# Patient Record
Sex: FEMALE | URBAN - NONMETROPOLITAN AREA
[De-identification: names, ages, dates, MRNs, and addresses within clinical notes are randomized per-mention and may not be internally consistent; named-entity substitution may affect disease eponyms.]

---

## 2018-05-17 PROBLEM — H52.13: Noted: 2018-05-17

## 2018-05-17 PROBLEM — H52.223: Noted: 2018-05-17

## 2019-10-24 ENCOUNTER — IMPORTED ENCOUNTER (OUTPATIENT)
Dept: URBAN - NONMETROPOLITAN AREA CLINIC 1 | Facility: CLINIC | Age: 29
End: 2019-10-24

## 2019-10-24 PROCEDURE — 92014 COMPRE OPH EXAM EST PT 1/>: CPT

## 2019-10-24 PROCEDURE — 92015 DETERMINE REFRACTIVE STATE: CPT

## 2022-04-09 ASSESSMENT — VISUAL ACUITY
OD_SC: J1+
OD_CC: 20/40
OS_CC: 20/30-1
OS_SC: J1+

## 2022-04-09 ASSESSMENT — TONOMETRY
OS_IOP_MMHG: 16
OD_IOP_MMHG: 16

## 2022-10-10 NOTE — PATIENT DISCUSSION
Educated patient on findings and condition. Prescribe artificial tears BID/prn OU and warm compresses QD/prn OU. Monitor.

## 2022-10-10 NOTE — PATIENT DISCUSSION
Educated patient on findings. Sudden onset without other neurologic/systemic symptoms. Refer to Dr. Azeem Davis for evaluation and management. Recommend monocular occlusion using semi-occlusive tape on glasses. Discussed si/sx of stroke etc and advised to RTC to emergency room ASAP if any occur. Monitor.